# Patient Record
Sex: MALE | Race: WHITE | NOT HISPANIC OR LATINO | URBAN - METROPOLITAN AREA
[De-identification: names, ages, dates, MRNs, and addresses within clinical notes are randomized per-mention and may not be internally consistent; named-entity substitution may affect disease eponyms.]

---

## 2023-07-14 ENCOUNTER — INPATIENT (INPATIENT)
Facility: HOSPITAL | Age: 58
LOS: 1 days | Discharge: HOME | End: 2023-07-16
Attending: STUDENT IN AN ORGANIZED HEALTH CARE EDUCATION/TRAINING PROGRAM | Admitting: STUDENT IN AN ORGANIZED HEALTH CARE EDUCATION/TRAINING PROGRAM
Payer: MEDICAID

## 2023-07-14 VITALS
RESPIRATION RATE: 16 BRPM | HEART RATE: 63 BPM | OXYGEN SATURATION: 99 % | TEMPERATURE: 98 F | SYSTOLIC BLOOD PRESSURE: 171 MMHG | DIASTOLIC BLOOD PRESSURE: 96 MMHG

## 2023-07-14 DIAGNOSIS — R07.9 CHEST PAIN, UNSPECIFIED: ICD-10-CM

## 2023-07-14 LAB
ALBUMIN SERPL ELPH-MCNC: 4.8 G/DL — SIGNIFICANT CHANGE UP (ref 3.3–5)
ALP SERPL-CCNC: 80 U/L — SIGNIFICANT CHANGE UP (ref 40–120)
ALT FLD-CCNC: 17 U/L — SIGNIFICANT CHANGE UP (ref 4–41)
ANION GAP SERPL CALC-SCNC: 13 MMOL/L — SIGNIFICANT CHANGE UP (ref 7–14)
APTT BLD: 38.6 SEC — HIGH (ref 27–36.3)
AST SERPL-CCNC: 24 U/L — SIGNIFICANT CHANGE UP (ref 4–40)
BASOPHILS # BLD AUTO: 0.04 K/UL — SIGNIFICANT CHANGE UP (ref 0–0.2)
BASOPHILS NFR BLD AUTO: 0.5 % — SIGNIFICANT CHANGE UP (ref 0–2)
BILIRUB SERPL-MCNC: 0.5 MG/DL — SIGNIFICANT CHANGE UP (ref 0.2–1.2)
BUN SERPL-MCNC: 12 MG/DL — SIGNIFICANT CHANGE UP (ref 7–23)
CALCIUM SERPL-MCNC: 10.5 MG/DL — SIGNIFICANT CHANGE UP (ref 8.4–10.5)
CHLORIDE SERPL-SCNC: 97 MMOL/L — LOW (ref 98–107)
CO2 SERPL-SCNC: 29 MMOL/L — SIGNIFICANT CHANGE UP (ref 22–31)
CREAT SERPL-MCNC: 0.71 MG/DL — SIGNIFICANT CHANGE UP (ref 0.5–1.3)
EGFR: 106 ML/MIN/1.73M2 — SIGNIFICANT CHANGE UP
EOSINOPHIL # BLD AUTO: 0.21 K/UL — SIGNIFICANT CHANGE UP (ref 0–0.5)
EOSINOPHIL NFR BLD AUTO: 2.5 % — SIGNIFICANT CHANGE UP (ref 0–6)
GLUCOSE SERPL-MCNC: 83 MG/DL — SIGNIFICANT CHANGE UP (ref 70–99)
HCT VFR BLD CALC: 43.8 % — SIGNIFICANT CHANGE UP (ref 39–50)
HGB BLD-MCNC: 14.4 G/DL — SIGNIFICANT CHANGE UP (ref 13–17)
IANC: 5.95 K/UL — SIGNIFICANT CHANGE UP (ref 1.8–7.4)
IMM GRANULOCYTES NFR BLD AUTO: 0.2 % — SIGNIFICANT CHANGE UP (ref 0–0.9)
INR BLD: 1.12 RATIO — SIGNIFICANT CHANGE UP (ref 0.88–1.16)
LYMPHOCYTES # BLD AUTO: 1.7 K/UL — SIGNIFICANT CHANGE UP (ref 1–3.3)
LYMPHOCYTES # BLD AUTO: 20.2 % — SIGNIFICANT CHANGE UP (ref 13–44)
MCHC RBC-ENTMCNC: 30.6 PG — SIGNIFICANT CHANGE UP (ref 27–34)
MCHC RBC-ENTMCNC: 32.9 GM/DL — SIGNIFICANT CHANGE UP (ref 32–36)
MCV RBC AUTO: 93 FL — SIGNIFICANT CHANGE UP (ref 80–100)
MONOCYTES # BLD AUTO: 0.5 K/UL — SIGNIFICANT CHANGE UP (ref 0–0.9)
MONOCYTES NFR BLD AUTO: 5.9 % — SIGNIFICANT CHANGE UP (ref 2–14)
NEUTROPHILS # BLD AUTO: 5.95 K/UL — SIGNIFICANT CHANGE UP (ref 1.8–7.4)
NEUTROPHILS NFR BLD AUTO: 70.7 % — SIGNIFICANT CHANGE UP (ref 43–77)
NRBC # BLD: 0 /100 WBCS — SIGNIFICANT CHANGE UP (ref 0–0)
NRBC # FLD: 0 K/UL — SIGNIFICANT CHANGE UP (ref 0–0)
PLATELET # BLD AUTO: 173 K/UL — SIGNIFICANT CHANGE UP (ref 150–400)
POTASSIUM SERPL-MCNC: 4.9 MMOL/L — SIGNIFICANT CHANGE UP (ref 3.5–5.3)
POTASSIUM SERPL-SCNC: 4.9 MMOL/L — SIGNIFICANT CHANGE UP (ref 3.5–5.3)
PROT SERPL-MCNC: 8.8 G/DL — HIGH (ref 6–8.3)
PROTHROM AB SERPL-ACNC: 13 SEC — SIGNIFICANT CHANGE UP (ref 10.5–13.4)
RBC # BLD: 4.71 M/UL — SIGNIFICANT CHANGE UP (ref 4.2–5.8)
RBC # FLD: 12.8 % — SIGNIFICANT CHANGE UP (ref 10.3–14.5)
SODIUM SERPL-SCNC: 139 MMOL/L — SIGNIFICANT CHANGE UP (ref 135–145)
TROPONIN T, HIGH SENSITIVITY RESULT: 10 NG/L — SIGNIFICANT CHANGE UP
TROPONIN T, HIGH SENSITIVITY RESULT: 9 NG/L — SIGNIFICANT CHANGE UP
WBC # BLD: 8.42 K/UL — SIGNIFICANT CHANGE UP (ref 3.8–10.5)
WBC # FLD AUTO: 8.42 K/UL — SIGNIFICANT CHANGE UP (ref 3.8–10.5)

## 2023-07-14 PROCEDURE — 99223 1ST HOSP IP/OBS HIGH 75: CPT

## 2023-07-14 PROCEDURE — 71045 X-RAY EXAM CHEST 1 VIEW: CPT | Mod: 26

## 2023-07-14 PROCEDURE — 99285 EMERGENCY DEPT VISIT HI MDM: CPT

## 2023-07-14 RX ORDER — METHADONE HYDROCHLORIDE 40 MG/1
150 TABLET ORAL ONCE
Refills: 0 | Status: DISCONTINUED | OUTPATIENT
Start: 2023-07-14 | End: 2023-07-14

## 2023-07-14 RX ORDER — ACETAMINOPHEN 500 MG
650 TABLET ORAL EVERY 6 HOURS
Refills: 0 | Status: DISCONTINUED | OUTPATIENT
Start: 2023-07-14 | End: 2023-07-16

## 2023-07-14 RX ADMIN — METHADONE HYDROCHLORIDE 150 MILLIGRAM(S): 40 TABLET ORAL at 13:22

## 2023-07-14 NOTE — H&P ADULT - NSICDXPASTMEDICALHX_GEN_ALL_CORE_FT
PAST MEDICAL HISTORY:  BPH (benign prostatic hyperplasia)     CVA (cerebrovascular accident)     Essential hypertension     IV drug abuse     Methadone maintenance therapy patient     Polysubstance abuse     Schizophrenia     Stomach ulcer

## 2023-07-14 NOTE — PATIENT PROFILE ADULT - FALL HARM RISK - HARM RISK INTERVENTIONS

## 2023-07-14 NOTE — ED ADULT NURSE REASSESSMENT NOTE - NS ED NURSE REASSESS COMMENT FT1
Pt calm cooperative, no distress noted. Denies any chest pain at present. NSR on tele monitor. Food tray given to patient. Tolerated well.

## 2023-07-14 NOTE — ED ADULT NURSE NOTE - OBJECTIVE STATEMENT
Facilitator RN-  [id number] Patient received in stretcher. AOX4. Respirations even and unlabored.  Spontaneous movement of all extremities noted. Presents to ER c/o palpitations and increase of anxiety due to not being able to get his Methadone because of dosage being unavailable. Patient states he didn't want to stay there due to " unsanitary conditions" and decided to come to ER instead. MD at bedside for eval. Pending orders. Denies SI, HI, visual or auditory disturbances. Comfort and safety maintained. All current care needs met. Care plan continued Gee LOCKWOOD

## 2023-07-14 NOTE — H&P ADULT - NSHPLABSRESULTS_GEN_ALL_CORE
14.4   8.42  )-----------( 173      ( 14 Jul 2023 11:18 )             43.8     139  |  97<L>  |  12  ----------------------------<  83     07-14  4.9   |  29  |  0.71    Ca    10.5      14 Jul 2023 11:18  Phos  4.5     07-14  Mg     2.00     07-14    TPro  8.8<H>  /  Alb  4.8  /  TBili  0.5  /  DBili  x   /  AST  24  /  ALT  17  /  AlkPhos  80  07-14    PT/INR: 13.0/1.12 (07-14-23 @ 11:18)  PTT: 38.6 (07-14-23 @ 11:18)    hs Troponin, T - 9 ng/L (07-14-23 @ 23:43)  hs Troponin, T - 9 ng/L (07-14-23 @ 13:45)  hs Troponin, T - 10 ng/L (07-14-23 @ 11:18)    Hgb A1c: 5.4 (07-14-23 @ 23:43)    Thyroid Stimulating Hormone, Serum: 2.01 uIU/mL (07.14.23 @ 23:43)    CXR personally reviewed and interpreted - No focal consolidations, hyperinflated     EKG personally reviewed and interpreted - NSR 61bpm, LVH, QTc 475ms, biphasic T in II, III, aVF. U wave V2

## 2023-07-14 NOTE — ED PROVIDER NOTE - CLINICAL SUMMARY MEDICAL DECISION MAKING FREE TEXT BOX
56 yo M w/ PMHx of CVA, ACS, HTN, on amlodipine/lisinopril, stomach ulcer on omeprazole, BPH on prazosin/Flomax, cocaine/heroin snorting (previous IV drug user), and unspecified psych disorder on Abilify/clonazepam presents from [facility] for irregular blood pressures and chest pain after not receiving methadone 150 mg dosage at 16 today morning.  Vital signs remarkable for /100.  Physical exam remarkable for crackles in lung, tremors, and agitation.  Plan for ACS labs, EKG, chest x-ray, and methadone.  We will monitor for opiate withdrawal.  Dispo likely admission.

## 2023-07-14 NOTE — H&P ADULT - NSICDXFAMILYHX_GEN_ALL_CORE_FT
FAMILY HISTORY:  Mother  Still living? Unknown  FH: premature coronary heart disease, Age at diagnosis: Age Unknown

## 2023-07-14 NOTE — ED ADULT NURSE REASSESSMENT NOTE - NS ED NURSE REASSESS COMMENT FT1
Pt denies any discomfort at present. NSR on tele monitor. Denies any chest pain, dizziness, SOB. Pt calm cooperative, awaiting bed assignment. Pt verbalized understanding.

## 2023-07-14 NOTE — ED ADULT NURSE NOTE - CHIEF COMPLAINT QUOTE
Pt coming from [facility] c/o chest pain beginning this morning, reports he did not take his methadone this morning. Reports blood pressure has been fluctuating, denies headache, dizziness. Currently denies CP.

## 2023-07-14 NOTE — ED PROVIDER NOTE - PHYSICAL EXAMINATION
GENERAL: no acute distress, ectomorphic body habitus  HEENT: atraumatic, normocephalic, vision grossly intact, EOMI, no conjunctivitis or discharge, hearing grossly intact, no nasal discharge or epistaxis, clear pharynx  CV: regular rate, normal rhythm, normal S1/S2, no murmurs/rubs, no cyanosis  PULM: normal work of breathing, normal O2 saturation on -, clear breath sounds in b/l upper/lower lung fields, no crackles/rales/rhonchi/wheezing  GI: soft/non-tender/nondistended abdomen, no guarding or rebound tenderness, no palpable masses  : no CVA tenderness  NEURO: A&Ox4, follows commands, normal speech, no focal motor or sensory deficits  MSK: no joint tenderness/swelling/erythema, ranging all extremities with no appreciable loss of ROM  EXT: no peripheral edema, no calf tenderness, no redness or swelling  SKIN: warm, dry, and intact, no rashes  PSYCH: appropriate mood and affect GENERAL: no acute distress, ectomorphic body habitus  HEENT: atraumatic, normocephalic, vision grossly intact, EOMI, no conjunctivitis or discharge, hearing grossly intact, no nasal discharge or epistaxis, clear pharynx  CV: regular rate, normal rhythm, normal S1/S2, no murmurs/rubs, no cyanosis  PULM: crackles, normal work of breathing, clear breath sounds in b/l upper/lower lung fields  GI: soft/non-tender/nondistended abdomen, no guarding or rebound tenderness, no palpable masses  NEURO: tremors, A&Ox4, follows commands, normal speech, no focal motor or sensory deficits  MSK: no joint tenderness/swelling/erythema, ranging all extremities with no appreciable loss of ROM  EXT: no peripheral edema, no calf tenderness, no redness or swelling  SKIN: warm, dry, and intact, no rashes  PSYCH: agitated

## 2023-07-14 NOTE — H&P ADULT - TIME BILLING
Preparing to see the patient including review of tests and other providers' notes, confirming history with patient via  phone, performing medical examination and evaluation, counseling and educating the patient, ordering medications, tests, communicating with other health care professionals, documenting clinical information in the EMR, independently interpreting results and communicating results to the patient, care coordination

## 2023-07-14 NOTE — H&P ADULT - HISTORY OF PRESENT ILLNESS
57 -year-old male with a history of CVA, HTN, stomach ulcer, BPH, polysubstance abuse on methadone, schizophrenia, presenting from [facility] for elevated blood pressure (197/107) and chest pain after not receiving methadone 150 mg dosage this morning. Patient has been on methadone 150 mg daily since 1995 and has been receiving methadone through the [facility] (ID [id number], [address] [place] [address] Street in [place]).  After multiple relapses (cocaine/heroin/fentanyl) over the past 1-2 years patient sought rehabilitation services and was referred to [facility] today.  [place] was stated that they would not be able to give his methadone doses until 4 PM after the onboarding process however he did not wish to wait until then due to chest pain, and pain associate with stomach ulcer.  He was asked to present to the ED for the symptoms and was transported .  He last used cocaine and fentanyl 2 days ago and had chest pain afterwards. Chest pain is left sided, radiates to L arm, and posterior L shoulder associated with shortness of breath and dizziness intermittently. He denies nausea or diaphoresis. He denies any history of MI or CAD, denies prior cardiac w/u.     In the ED VS: 98.3  59-66  139-171/52-96  16-18  %RA, received methadone 150mg PO x1 57 -year-old male with a history of CVA, HTN, stomach ulcer, history of seizure s/p concussion (last seizure 1 year ago), BPH, polysubstance abuse on methadone, schizophrenia, presenting from [facility] for elevated blood pressure (197/107) and chest pain after not receiving methadone 150 mg dosage this morning. Patient has been on methadone 150 mg daily since 1995 and has been receiving methadone through the [facility] (ID [id number], [address] [place] [address] Street in [place]).  After multiple relapses (cocaine/heroin/fentanyl) over the past 1-2 years patient sought rehabilitation services and was referred to [facility] today.  [place] was stated that they would not be able to give his methadone doses until 4 PM after the onboarding process however he did not wish to wait until then due to chest pain, and pain associate with stomach ulcer.  He was asked to present to the ED for the symptoms and was transported .  He last used cocaine and fentanyl 2 days ago and had chest pain afterwards. Chest pain is left sided, radiates to L arm, and posterior L shoulder associated with shortness of breath and dizziness intermittently. He denies nausea or diaphoresis. He denies any history of MI or CAD, denies prior cardiac w/u.     In the ED VS: 98.3  59-66  139-171/52-96  16-18  %RA, received methadone 150mg PO x1

## 2023-07-14 NOTE — ED ADULT NURSE REASSESSMENT NOTE - NS ED NURSE REASSESS COMMENT FT1
Break RN, pt resting in stretcher on tele monitoring. respirations even and unlabored. has no complaints at this time, only wants his methodone dose. called pharmacy, pending verification. pt made aware of delay.

## 2023-07-14 NOTE — H&P ADULT - NSHPREVIEWOFSYSTEMS_GEN_ALL_CORE
REVIEW OF SYSTEMS:    CONSTITUTIONAL: No fevers; reported chills earlier  EYES/ENT: No visual changes; No dysphagia; No sore throat; No rhinorrhea; No sinus pain/pressure  NECK: No pain or stiffness  RESPIRATORY: (+) cough productive of green mucous; No wheezing or hemoptysis; (+) shortness of breath  CARDIOVASCULAR: (+) chest pain; No palpitations; No lower extremity edema  GASTROINTESTINAL: No abdominal or epigastric pain. No nausea, vomiting, or hematemesis; No diarrhea or constipation. No melena or hematochezia.  GENITOURINARY: No dysuria, frequency or hematuria  NEUROLOGICAL: No numbness, paresthesias, or weakness; No HA; No LH/dizziness  MSK: ambulates without aid; No falls  SKIN: No itching, burning, rashes, or lesions   All other review of systems is negative unless indicated above.

## 2023-07-14 NOTE — H&P ADULT - PROBLEM SELECTOR PLAN 1
patient relates pain to stress, reproducible on exam  also reports pain after using cocaine, last used 3d ago reportedly only a small amount, educated patient on no safe amount of cocaine, should completely abstain  check echo  flat troponin   bradycardic with intermittent bigem on monitor, consulted cards given report of chest pain during my exam  monitor on tele   TSH wnl  f/u official read of cxr

## 2023-07-14 NOTE — ED PROVIDER NOTE - ATTENDING CONTRIBUTION TO CARE
57-year-old male history of hypertension, ACS, CVA, drug use disorder, on methadone, reports relapsing 1 year ago, last drug use reportedly 3 days ago when he used cocaine and fentanyl.  Patient states at that time he developed chest pain, which is why he used fentanyl after cocaine.  Patient states he typically gets methadone from [place], but at the request of his mother enrolled in rehab at [facility], today was the first day.  It did not have the methadone ready for him this morning, typically gets dose at 6 AM but was told that his dose would not be ready till 4 PM.  Patient attributes his symptoms today to not getting his methadone.  Patient states pain has been there since doing cocaine a few days ago.  Denies associated nausea, vomiting, diaphoresis.  On exam patient is anxious appearing, uncomfortable likely secondary to withdrawal symptoms from not receiving methadone, otherwise heart regular rate and rhythm, lungs clear to auscultation bilaterally.  Plan for EKG chest x-ray, labs, cardiac monitor patient to be admitted, will treat with regular dose of methadone.

## 2023-07-14 NOTE — H&P ADULT - PROBLEM SELECTOR PLAN 7
SCDs for DVT ppx SCDs for DVT ppx    #seizure history  reportedly on unknown antiepileptic  confirm home meds in AM and restart

## 2023-07-14 NOTE — ED PROVIDER NOTE - OBJECTIVE STATEMENT
58 yo M w/ PMHx of CVA, ACS, HTN, on amlodipine/lisinopril, stomach ulcer on omeprazole, BPH on prazosin/Flomax, cocaine/heroin snorting (previous IV drug user), and unspecified psych disorder on Abilify/clonazepam presents from [facility] for irregular blood pressures and chest pain after not receiving methadone 150 mg dosage at 16 today morning.  Patient's been on methadone 150 mg daily since 1995.  He has been receiving methadone through the [facility] (ID [id number], [address] [place] [address] in [place]).  After multiple relapses (cocaine/heroin/fentanyl) over the past 1-2 years patient sought rehabilitation services and was referred to [facility] [place] today.  [place] was stated that they would not be able to give his methadone doses until 4 PM after the onboarding process.  He did not wish to wait till then due to CP, irregular BPs, and pain associate with stomach ulcer.  He was asked to present to the ED for the symptoms.  He last used cocaine and fentanyl 2 days ago and had chest pain.  Today, on ROS she endorses shortness of breath, palpitations, and tremors.    [place] : [id number]

## 2023-07-14 NOTE — ED ADULT NURSE REASSESSMENT NOTE - NS ED NURSE REASSESS COMMENT FT1
Report received from FABIÁN Sarmiento. Pt at baseline mental status, breathing even and unlabored in bed. Pt denies chest pain, SOB, dizziness, headache, blurry vision, chills. Bed in lowest position, pt awaiting transport to . Report had to be faxed due to  RN being unable to take report after calling 3 times.

## 2023-07-14 NOTE — ED ADULT NURSE NOTE - NSFALLUNIVINTERV_ED_ALL_ED
Bed/Stretcher in lowest position, wheels locked, appropriate side rails in place/Call bell, personal items and telephone in reach/Instruct patient to call for assistance before getting out of bed/chair/stretcher/Non-slip footwear applied when patient is off stretcher/[place] to call system/Physically safe environment - no spills, clutter or unnecessary equipment/Purposeful proactive rounding/Room/bathroom lighting operational, light cord in reach

## 2023-07-14 NOTE — H&P ADULT - PROBLEM SELECTOR PLAN 5
presents with 5 medications in bag, PPI not included however per ED notes patient on PPI  will c/w PPI for now, confirm home meds with PMD in AM if able

## 2023-07-14 NOTE — H&P ADULT - ASSESSMENT
57 -year-old male with a history of CVA, HTN, stomach ulcer, BPH, polysubstance abuse on methadone, schizophrenia, presenting from [facility] for "irregular" blood pressures and chest pain after not receiving methadone 150 mg dosage this morning
Yes

## 2023-07-14 NOTE — H&P ADULT - PROBLEM SELECTOR PLAN 4
reportedly taking Zyprexa however unknown dose  Notes from ED report patient taking Abilify  Would consult psych in AM  denies SI/HI, AH/VH  c/w clonazepam ([facility] Reference #: [id number])

## 2023-07-14 NOTE — ED PROVIDER NOTE - PROGRESS NOTE DETAILS
Self-Care for Sore Throats  Sore throats happen for many reasons, such as colds, allergies, and infections caused by viruses or bacteria. In any case, your throat becomes red and sore.  Your goal for self-care is to reduce your discomfort while giving you Contact your healthcare provider if you have:  · A temperature over 101°F (38.3°C)  · White spots on the throat  · Great difficulty swallowing  · Trouble breathing  · A skin rash  · Recent exposure to someone else with strep bacteria  · Severe hoarseness a Jessica PGY2: Labs unremarkable.  Patient reports improvement in symptoms after receiving methadone.  He wishes to go home and follow-up with outpatient methadone clinic.  He does not want to go to [facility] addiction facility.  Discussed with patient that multiple episodes of chest pain after cocaine use is concerning for ACS.  Also discussed with patient be able to also receive methadone while inpatient, be evaluated by cardiology, and would be able to discuss alternate rehabilitation programs.  Patient agreeable to admission.  Admitted to hospitalist under telemetry.

## 2023-07-14 NOTE — H&P ADULT - PROBLEM SELECTOR PLAN 2
confirm methadone dosing with [facility] in AM and restart daily methadone  educated to abstain from cocaine    consult in AM

## 2023-07-14 NOTE — H&P ADULT - NSHPPHYSICALEXAM_GEN_ALL_CORE
Vital Signs Last 24 Hrs  T(C): 36.8 (14 Jul 2023 21:59), Max: 36.8 (14 Jul 2023 17:21)  T(F): 98.3 (14 Jul 2023 21:59), Max: 98.3 (14 Jul 2023 17:21)  HR: 59 (14 Jul 2023 21:59) (59 - 92)  BP: 144/79 (14 Jul 2023 21:59) (126/80 - 171/96)  RR: 18 (14 Jul 2023 21:59) (16 - 18)  SpO2: 98% (14 Jul 2023 21:59) (95% - 100%)    Parameters below as of 14 Jul 2023 21:59  Patient On (Oxygen Delivery Method): room air    PHYSICAL EXAM:  GENERAL: NAD, thin  HEAD:  Atraumatic, temporal wasting  EYES: EOMI, PERRL, conjunctiva and sclera clear  NECK: Supple, No JVD  CHEST/LUNG: Clear to auscultation bilaterally; No wheezes, rales or rhonchi; normal work of breathing, speaking in full sentences  HEART: irregular rate (bigem on monitor), bradycardic; No murmurs, rubs, or gallops, (+)S1, S2; chest pain reproducible on exam  ABDOMEN: Soft, Nondistended; Normal Bowel sounds; (+)epigastric TTP  EXTREMITIES:  2+ Peripheral Pulses, No clubbing, cyanosis, or edema  PSYCH: agitated with questions, aside from interrupting interviewer and  and expletives expressed to , remaining calm and relatively cooperative with exam with expressed frustration, A&Ox3, denies SI/HI, AH/VH  NEUROLOGY: no focal neuro deficits, strength 5/5 x4 extremities, sensation grossly intact  SKIN: No rashes or lesions on limited exam in ED hallway

## 2023-07-15 DIAGNOSIS — R00.1 BRADYCARDIA, UNSPECIFIED: ICD-10-CM

## 2023-07-15 DIAGNOSIS — N40.0 BENIGN PROSTATIC HYPERPLASIA WITHOUT LOWER URINARY TRACT SYMPTOMS: ICD-10-CM

## 2023-07-15 DIAGNOSIS — I25.10 ATHEROSCLEROTIC HEART DISEASE OF NATIVE CORONARY ARTERY WITHOUT ANGINA PECTORIS: ICD-10-CM

## 2023-07-15 DIAGNOSIS — Z29.9 ENCOUNTER FOR PROPHYLACTIC MEASURES, UNSPECIFIED: ICD-10-CM

## 2023-07-15 DIAGNOSIS — F19.10 OTHER PSYCHOACTIVE SUBSTANCE ABUSE, UNCOMPLICATED: ICD-10-CM

## 2023-07-15 DIAGNOSIS — F20.9 SCHIZOPHRENIA, UNSPECIFIED: ICD-10-CM

## 2023-07-15 DIAGNOSIS — K25.9 GASTRIC ULCER, UNSPECIFIED AS ACUTE OR CHRONIC, WITHOUT HEMORRHAGE OR PERFORATION: ICD-10-CM

## 2023-07-15 DIAGNOSIS — R07.9 CHEST PAIN, UNSPECIFIED: ICD-10-CM

## 2023-07-15 DIAGNOSIS — I10 ESSENTIAL (PRIMARY) HYPERTENSION: ICD-10-CM

## 2023-07-15 LAB
A1C WITH ESTIMATED AVERAGE GLUCOSE RESULT: 5.4 % — SIGNIFICANT CHANGE UP (ref 4–5.6)
ANION GAP SERPL CALC-SCNC: 9 MMOL/L — SIGNIFICANT CHANGE UP (ref 7–14)
BUN SERPL-MCNC: 21 MG/DL — SIGNIFICANT CHANGE UP (ref 7–23)
CALCIUM SERPL-MCNC: 10 MG/DL — SIGNIFICANT CHANGE UP (ref 8.4–10.5)
CHLORIDE SERPL-SCNC: 102 MMOL/L — SIGNIFICANT CHANGE UP (ref 98–107)
CHOLEST SERPL-MCNC: 131 MG/DL — SIGNIFICANT CHANGE UP
CO2 SERPL-SCNC: 29 MMOL/L — SIGNIFICANT CHANGE UP (ref 22–31)
CREAT SERPL-MCNC: 0.92 MG/DL — SIGNIFICANT CHANGE UP (ref 0.5–1.3)
EGFR: 96 ML/MIN/1.73M2 — SIGNIFICANT CHANGE UP
ESTIMATED AVERAGE GLUCOSE: 108 — SIGNIFICANT CHANGE UP
GLUCOSE SERPL-MCNC: 91 MG/DL — SIGNIFICANT CHANGE UP (ref 70–99)
HCT VFR BLD CALC: 40.9 % — SIGNIFICANT CHANGE UP (ref 39–50)
HCV AB S/CO SERPL IA: 12.29 S/CO — HIGH (ref 0–0.99)
HCV AB SERPL-IMP: REACTIVE
HDLC SERPL-MCNC: 62 MG/DL — SIGNIFICANT CHANGE UP
HGB BLD-MCNC: 13.7 G/DL — SIGNIFICANT CHANGE UP (ref 13–17)
LIPID PNL WITH DIRECT LDL SERPL: 56 MG/DL — SIGNIFICANT CHANGE UP
MAGNESIUM SERPL-MCNC: 2 MG/DL — SIGNIFICANT CHANGE UP (ref 1.6–2.6)
MAGNESIUM SERPL-MCNC: 2 MG/DL — SIGNIFICANT CHANGE UP (ref 1.6–2.6)
MCHC RBC-ENTMCNC: 31.4 PG — SIGNIFICANT CHANGE UP (ref 27–34)
MCHC RBC-ENTMCNC: 33.5 GM/DL — SIGNIFICANT CHANGE UP (ref 32–36)
MCV RBC AUTO: 93.6 FL — SIGNIFICANT CHANGE UP (ref 80–100)
NON HDL CHOLESTEROL: 69 MG/DL — SIGNIFICANT CHANGE UP
NRBC # BLD: 0 /100 WBCS — SIGNIFICANT CHANGE UP (ref 0–0)
NRBC # FLD: 0 K/UL — SIGNIFICANT CHANGE UP (ref 0–0)
NT-PROBNP SERPL-SCNC: 410 PG/ML — HIGH
PHOSPHATE SERPL-MCNC: 3.5 MG/DL — SIGNIFICANT CHANGE UP (ref 2.5–4.5)
PHOSPHATE SERPL-MCNC: 4.5 MG/DL — SIGNIFICANT CHANGE UP (ref 2.5–4.5)
PLATELET # BLD AUTO: 141 K/UL — LOW (ref 150–400)
POTASSIUM SERPL-MCNC: 3.9 MMOL/L — SIGNIFICANT CHANGE UP (ref 3.5–5.3)
POTASSIUM SERPL-SCNC: 3.9 MMOL/L — SIGNIFICANT CHANGE UP (ref 3.5–5.3)
RBC # BLD: 4.37 M/UL — SIGNIFICANT CHANGE UP (ref 4.2–5.8)
RBC # FLD: 12.7 % — SIGNIFICANT CHANGE UP (ref 10.3–14.5)
SODIUM SERPL-SCNC: 140 MMOL/L — SIGNIFICANT CHANGE UP (ref 135–145)
TRIGL SERPL-MCNC: 64 MG/DL — SIGNIFICANT CHANGE UP
TROPONIN T, HIGH SENSITIVITY RESULT: 9 NG/L — SIGNIFICANT CHANGE UP
TSH SERPL-MCNC: 2.01 UIU/ML — SIGNIFICANT CHANGE UP (ref 0.27–4.2)
WBC # BLD: 5.3 K/UL — SIGNIFICANT CHANGE UP (ref 3.8–10.5)
WBC # FLD AUTO: 5.3 K/UL — SIGNIFICANT CHANGE UP (ref 3.8–10.5)

## 2023-07-15 PROCEDURE — 99232 SBSQ HOSP IP/OBS MODERATE 35: CPT

## 2023-07-15 PROCEDURE — 99221 1ST HOSP IP/OBS SF/LOW 40: CPT

## 2023-07-15 RX ORDER — LISINOPRIL 2.5 MG/1
40 TABLET ORAL DAILY
Refills: 0 | Status: DISCONTINUED | OUTPATIENT
Start: 2023-07-15 | End: 2023-07-16

## 2023-07-15 RX ORDER — ALBUTEROL 90 UG/1
2 AEROSOL, METERED ORAL
Refills: 0 | DISCHARGE

## 2023-07-15 RX ORDER — CLONAZEPAM 1 MG
1 TABLET ORAL THREE TIMES A DAY
Refills: 0 | Status: DISCONTINUED | OUTPATIENT
Start: 2023-07-15 | End: 2023-07-16

## 2023-07-15 RX ORDER — METHADONE HYDROCHLORIDE 40 MG/1
150 TABLET ORAL
Refills: 0 | DISCHARGE

## 2023-07-15 RX ORDER — OLANZAPINE 15 MG/1
1 TABLET, FILM COATED ORAL
Refills: 0 | DISCHARGE

## 2023-07-15 RX ORDER — LISINOPRIL 2.5 MG/1
1 TABLET ORAL
Refills: 0 | DISCHARGE

## 2023-07-15 RX ORDER — CLONAZEPAM 1 MG
1 TABLET ORAL
Refills: 0 | DISCHARGE

## 2023-07-15 RX ORDER — GABAPENTIN 400 MG/1
600 CAPSULE ORAL DAILY
Refills: 0 | Status: DISCONTINUED | OUTPATIENT
Start: 2023-07-15 | End: 2023-07-16

## 2023-07-15 RX ORDER — TAMSULOSIN HYDROCHLORIDE 0.4 MG/1
0.4 CAPSULE ORAL AT BEDTIME
Refills: 0 | Status: DISCONTINUED | OUTPATIENT
Start: 2023-07-15 | End: 2023-07-16

## 2023-07-15 RX ORDER — HALOPERIDOL DECANOATE 100 MG/ML
2.5 INJECTION INTRAMUSCULAR EVERY 6 HOURS
Refills: 0 | Status: DISCONTINUED | OUTPATIENT
Start: 2023-07-15 | End: 2023-07-15

## 2023-07-15 RX ORDER — ALBUTEROL 90 UG/1
2 AEROSOL, METERED ORAL
Refills: 0 | Status: DISCONTINUED | OUTPATIENT
Start: 2023-07-15 | End: 2023-07-16

## 2023-07-15 RX ORDER — METHADONE HYDROCHLORIDE 40 MG/1
150 TABLET ORAL DAILY
Refills: 0 | Status: DISCONTINUED | OUTPATIENT
Start: 2023-07-15 | End: 2023-07-16

## 2023-07-15 RX ORDER — HYDROXYZINE HCL 10 MG
25 TABLET ORAL EVERY 6 HOURS
Refills: 0 | Status: DISCONTINUED | OUTPATIENT
Start: 2023-07-15 | End: 2023-07-16

## 2023-07-15 RX ORDER — GABAPENTIN 400 MG/1
1 CAPSULE ORAL
Refills: 0 | DISCHARGE

## 2023-07-15 RX ORDER — TAMSULOSIN HYDROCHLORIDE 0.4 MG/1
1 CAPSULE ORAL
Refills: 0 | DISCHARGE

## 2023-07-15 RX ADMIN — METHADONE HYDROCHLORIDE 150 MILLIGRAM(S): 40 TABLET ORAL at 09:58

## 2023-07-15 RX ADMIN — GABAPENTIN 600 MILLIGRAM(S): 400 CAPSULE ORAL at 12:09

## 2023-07-15 RX ADMIN — LISINOPRIL 40 MILLIGRAM(S): 2.5 TABLET ORAL at 05:27

## 2023-07-15 RX ADMIN — Medication 1 MILLIGRAM(S): at 05:26

## 2023-07-15 RX ADMIN — Medication 1 MILLIGRAM(S): at 13:00

## 2023-07-15 RX ADMIN — TAMSULOSIN HYDROCHLORIDE 0.4 MILLIGRAM(S): 0.4 CAPSULE ORAL at 21:42

## 2023-07-15 RX ADMIN — Medication 1 MILLIGRAM(S): at 21:42

## 2023-07-15 NOTE — CONSULT NOTE ADULT - SUBJECTIVE AND OBJECTIVE BOX
HPI: 57 -year-old male with a history of CVA, HTN, stomach ulcer, BPH, polysubstance abuse on methadone, schizophrenia, presenting from [facility] for elevated blood pressure (197/107) and chest pain after not receiving methadone 150 mg dosage this morning. cardiology consulted for atypical chest pain.    Pt is poor historian, history obtained from chart review and limited response from pt. Pt currently denies any cp, palpitation, sob, orthopnea, PND, dizziness, lightheadedness or syncope.     T(C): 36.8 (07-14-23 @ 21:59), Max: 36.8 (07-14-23 @ 17:21)  HR: 59 (07-14-23 @ 21:59) (59 - 92)  BP: 144/79 (07-14-23 @ 21:59) (126/80 - 171/96)  RR: 18 (07-14-23 @ 21:59) (16 - 18)  SpO2: 98% (07-14-23 @ 21:59) (95% - 100%)    MEDICATIONS  (STANDING):  clonazePAM  Tablet 1 milliGRAM(s) Oral three times a day  gabapentin 600 milliGRAM(s) Oral daily  lisinopril 40 milliGRAM(s) Oral daily  tamsulosin 0.4 milliGRAM(s) Oral at bedtime    MEDICATIONS  (PRN):  acetaminophen     Tablet .. 650 milliGRAM(s) Oral every 6 hours PRN Mild Pain (1 - 3)  albuterol    90 MICROgram(s) HFA Inhaler 2 Puff(s) Inhalation four times a day PRN for shortness of breath and/or wheezing      I&O's Summary                        x                    x    | x    | x            x     >-----------< x       ------------------------< x                     x                    x    | x    | x                                            Ca x     Mg 2.00  Ph 4.5    Urinalysis Basic - ( 14 Jul 2023 11:18 )    Color: x / Appearance: x / SG: x / pH: x  Gluc: 83 mg/dL / Ketone: x  / Bili: x / Urobili: x   Blood: x / Protein: x / Nitrite: x   Leuk Esterase: x / RBC: x / WBC x   Sq Epi: x / Non Sq Epi: x / Bacteria: x        PT/INR - ( 14 Jul 2023 11:18 )   PT: 13.0 sec;   INR: 1.12 ratio         PTT - ( 14 Jul 2023 11:18 )  PTT:38.6 sec

## 2023-07-15 NOTE — PROGRESS NOTE ADULT - PROBLEM SELECTOR PLAN 8
DVT: SCDs  Diet: regular  Dispo: back to [facility] DVT: SCDs  Diet: regular  Dispo: home

## 2023-07-15 NOTE — PROGRESS NOTE ADULT - PROBLEM SELECTOR PLAN 4
Appreciate  input  -c/w Klonopin 1 mg TID  -add Atarax 25 mg q6hr PO PRN for anxiety  -add Haldol 2.5 mg q4hr PRN for severe agitation  -add Ativan 1 mg I q4hr PRN for severe agitation

## 2023-07-15 NOTE — CONSULT NOTE ADULT - ASSESSMENT
HPI: 57 -year-old male with a history of CVA, HTN, stomach ulcer, BPH, polysubstance abuse on methadone, schizophrenia, presenting from [facility] for elevated blood pressure (197/107) and chest pain after not receiving methadone 150 mg dosage this morning. cardiology consulted for atypical chest pain.    Assessment and Plan:  # Atypical chest pain  Pt currently does not appear in distress. denies any cp, palpitation, sob.   EKG w NSR, no acute ST-T changes. Trop x 3 neg.   Per record pt w known h/o cocaine,fentanyl abuse.   Chest pain is reproducible on palpation, likely musculoskeletal.  Not suggestive of ACS  - Tele monitoring  - No further need to trend cardiac enzymes if pt is chest pain free  - TTE in AM   - Check U tox, A1C, lipid panel    # Sinus bradycardia  Noted to have sinus joon in 40s on tele. No evidence of AV block. one short run of bigemi on tele.   Pt not on BB or CCB at home  - check TSH   - avoid BB or CCB for now  - replete electrolytes to keep K>4, Mg>2  - tele monitoring    Case discussed w on call cards fellow Dr Ashraf  Cardiology will continue to follow

## 2023-07-15 NOTE — PROGRESS NOTE ADULT - SUBJECTIVE AND OBJECTIVE BOX
[facility]  Don Lara MD  Available via MS Teams      SUBJECTIVE / OVERNIGHT EVENTS: No acute events overnight. Patient seen and examined, writer spoke to patient in native [place]ian, no acute complaints chest pain has resolved    ADDITIONAL REVIEW OF SYSTEMS:  10 point ROS negative unless indicated above     MEDICATIONS  (STANDING):  clonazePAM  Tablet 1 milliGRAM(s) Oral three times a day  gabapentin 600 milliGRAM(s) Oral daily  lisinopril 40 milliGRAM(s) Oral daily  methadone    Tablet 150 milliGRAM(s) Oral daily  tamsulosin 0.4 milliGRAM(s) Oral at bedtime    MEDICATIONS  (PRN):  acetaminophen     Tablet .. 650 milliGRAM(s) Oral every 6 hours PRN Mild Pain (1 - 3)  albuterol    90 MICROgram(s) HFA Inhaler 2 Puff(s) Inhalation four times a day PRN for shortness of breath and/or wheezing      I&O's Summary      PHYSICAL EXAM:  Vital Signs Last 24 Hrs  T(C): 36.8 (15 Jul 2023 12:06), Max: 37.1 (14 Jul 2023 23:00)  T(F): 98.2 (15 Jul 2023 12:06), Max: 98.8 (14 Jul 2023 23:00)  HR: 62 (15 Jul 2023 12:06) (56 - 92)  BP: 134/95 (15 Jul 2023 12:06) (126/80 - 158/100)  BP(mean): --  RR: 18 (15 Jul 2023 12:06) (18 - 18)  SpO2: 95% (15 Jul 2023 12:06) (95% - 100%)    Parameters below as of 15 Jul 2023 08:30  Patient On (Oxygen Delivery Method): room air      CONSTITUTIONAL: NAD, well-developed, well-groomed  EYES: PERRLA; conjunctiva and sclera clear  ENMT: Moist oral mucosa, no pharyngeal injection or exudates; normal dentition  NECK: Supple, no palpable masses; no thyromegaly  RESPIRATORY: Normal respiratory effort; lungs are clear to auscultation bilaterally  CARDIOVASCULAR: Regular rate and rhythm, normal S1 and S2, no murmur/rub/gallop; No lower extremity edema; Peripheral pulses are 2+ bilaterally  ABDOMEN: Nontender to palpation, normoactive bowel sounds, no rebound/guarding; No hepatosplenomegaly  MUSCULOSKELETAL:  Normal gait; no clubbing or cyanosis of digits; no joint swelling or tenderness to palpation  PSYCH: A+O to person, place, and time; affect appropriate  NEUROLOGY: CN 2-12 are intact and symmetric; no gross sensory deficits   SKIN: No rashes; no palpable lesions    LABS:                        13.7   5.30  )-----------( 141      ( 15 Jul 2023 07:26 )             40.9     07-15    140  |  102  |  21  ----------------------------<  91  3.9   |  29  |  0.92    Ca    10.0      15 Jul 2023 07:26  Phos  3.5     07-15  Mg     2.00     07-15    TPro  8.8<H>  /  Alb  4.8  /  TBili  0.5  /  DBili  x   /  AST  24  /  ALT  17  /  AlkPhos  80  07-14    PT/INR - ( 14 Jul 2023 11:18 )   PT: 13.0 sec;   INR: 1.12 ratio         PTT - ( 14 Jul 2023 11:18 )  PTT:38.6 sec      Urinalysis Basic - ( 15 Jul 2023 07:26 )    Color: x / Appearance: x / SG: x / pH: x  Gluc: 91 mg/dL / Ketone: x  / Bili: x / Urobili: x   Blood: x / Protein: x / Nitrite: x   Leuk Esterase: x / RBC: x / WBC x   Sq Epi: x / Non Sq Epi: x / Bacteria: x            RADIOLOGY & ADDITIONAL TESTS:   No new imaging, previous imaging reviewed by me

## 2023-07-15 NOTE — PROGRESS NOTE ADULT - PROBLEM SELECTOR PLAN 1
Resolved, low suspicion for ACS.  -troponin flat  -tele monitoring  -CXR wnl   -A1C/lipids WNL  -pending TTE   -pending Utox, TSH

## 2023-07-15 NOTE — PROGRESS NOTE ADULT - ASSESSMENT
57 -year-old male with a history of CVA, HTN, stomach ulcer, BPH, polysubstance abuse on methadone, schizophrenia, presenting from [facility] for "irregular" blood pressures and chest pain after not receiving methadone 150 mg dosage this morning

## 2023-07-15 NOTE — PROGRESS NOTE ADULT - PROBLEM SELECTOR PLAN 2
Sinus, tele shows joon to 40's w/o evidence of block  -check TSH  -avoid AV justin agents   -replete lytes   -tele

## 2023-07-16 VITALS
OXYGEN SATURATION: 97 % | HEART RATE: 98 BPM | TEMPERATURE: 97 F | RESPIRATION RATE: 18 BRPM | DIASTOLIC BLOOD PRESSURE: 84 MMHG | SYSTOLIC BLOOD PRESSURE: 138 MMHG

## 2023-07-16 PROCEDURE — 93306 TTE W/DOPPLER COMPLETE: CPT | Mod: 26

## 2023-07-16 PROCEDURE — 99239 HOSP IP/OBS DSCHRG MGMT >30: CPT

## 2023-07-16 RX ORDER — ACETAMINOPHEN 500 MG
2 TABLET ORAL
Qty: 0 | Refills: 0 | DISCHARGE
Start: 2023-07-16

## 2023-07-16 RX ADMIN — Medication 1 MILLIGRAM(S): at 06:11

## 2023-07-16 RX ADMIN — METHADONE HYDROCHLORIDE 150 MILLIGRAM(S): 40 TABLET ORAL at 07:19

## 2023-07-16 RX ADMIN — GABAPENTIN 600 MILLIGRAM(S): 400 CAPSULE ORAL at 13:00

## 2023-07-16 RX ADMIN — LISINOPRIL 40 MILLIGRAM(S): 2.5 TABLET ORAL at 06:11

## 2023-07-16 RX ADMIN — Medication 1 MILLIGRAM(S): at 13:04

## 2023-07-16 NOTE — DISCHARGE NOTE PROVIDER - HOSPITAL COURSE
57 -year-old male with a history of CVA, HTN, stomach ulcer, BPH, polysubstance abuse on methadone, schizophrenia, presenting from [facility] for "irregular" blood pressures and chest pain after not receiving methadone 150 mg dosage this morning    Chest pain.   ·  Plan: Resolved, low suspicion for ACS.  -troponin flat  -tele monitoring  -CXR wnl   -A1C/lipids WNL  -pending TTE   -pending Utox, TSH.    Bradycardia.   ·  Plan: Sinus, tele shows joon to 40's w/o evidence of block  -check TSH  -avoid AV justin agents   -replete lytes   -tele.     Problem/Plan - 3:  ·  Problem: Polysubstance abuse.   ·  Plan: Methadone dosing confirmed   -c/w Methadone 150 mg daily   - consulted appreciate recs.    Schizophrenia.   ·  Plan: Appreciate  input  -c/w Klonopin 1 mg TID  -add Atarax 25 mg q6hr PO PRN for anxiety  -add Haldol 2.5 mg q4hr PRN for severe agitation  -add Ativan 1 mg I q4hr PRN for severe agitation.    Stomach ulcer.   ·  Plan: c/w PPI, will need to confirm med rec.     Essential hypertension.   ·  Plan: normotensive  -c/w Lisinopril.    BPH (benign prostatic hyperplasia).   ·  Plan: c/w Tamsulosin. 57 -year-old male with a history of CVA, HTN, stomach ulcer, BPH, polysubstance abuse on methadone, schizophrenia, presenting from [facility] for "irregular" blood pressures and chest pain after not receiving methadone 150 mg dosage this morning    Chest pain.   ·  Plan: Resolved, low suspicion for ACS.  -troponin flat  -tele monitoring  -CXR wnl   -A1C/lipids WNL  -TTE EF 68% unremarkable   -pending Utox, TSH.    Bradycardia.   ·  Plan: Sinus, tele shows joon to 40's w/o evidence of block  -check TSH  -avoid AV justin agents   -replete lytes   -tele.    Polysubstance abuse.   ·  Plan: Methadone dosing confirmed   -c/w Methadone 150 mg daily   - consulted appreciate recs.    Schizophrenia.   ·  Plan: Appreciate  input  -c/w Klonopin 1 mg TID  -add Atarax 25 mg q6hr PO PRN for anxiety  -add Haldol 2.5 mg q4hr PRN for severe agitation  -add Ativan 1 mg I q4hr PRN for severe agitation.    Stomach ulcer.   ·  Plan: c/w PPI, will need to confirm med rec.    Essential hypertension.   ·  Plan: normotensive  -c/w Lisinopril.    BPH (benign prostatic hyperplasia).   ·  Plan: c/w Tamsulosin. 57 -year-old male with a history of CVA, HTN, stomach ulcer, BPH, polysubstance abuse on methadone, schizophrenia, presenting from [facility] for "irregular" blood pressures and chest pain after not receiving methadone 150 mg dosage this morning. Pt was hemodynamically stable for discharge, no complaints of chest pain after admission, TTE unremarkable. Will need to follow-up with his PCP for Hep C was likely chronic.    Chest pain.   ·  Plan: Resolved, low suspicion for ACS.  -troponin flat  -tele monitoring  -CXR wnl   -A1C/lipids WNL  -TTE EF 68% unremarkable   -pending Utox, TSH.    Bradycardia.   ·  Plan: Sinus, tele shows joon to 40's w/o evidence of block  -avoid AV justin agents   -replete lytes   -tele.    Polysubstance abuse.   ·  Plan: Methadone dosing confirmed   -c/w Methadone 150 mg daily   - consulted appreciate recs.    Schizophrenia.   ·  Plan: Appreciate  input  -c/w Klonopin 1 mg TID  -add Atarax 25 mg q6hr PO PRN for anxiety  -add Haldol 2.5 mg q4hr PRN for severe agitation  -add Ativan 1 mg I q4hr PRN for severe agitation.    Stomach ulcer.   ·  Plan: c/w PPI, will need to confirm med rec.    Essential hypertension.   ·  Plan: normotensive  -c/w Lisinopril.    BPH (benign prostatic hyperplasia).   ·  Plan: c/w Tamsulosin.

## 2023-07-16 NOTE — CHART NOTE - NSCHARTNOTEFT_GEN_A_CORE
Consult requested for pt for agitation/wanting to leave AMA.   [facility] psych is short-staffed but resident on call was able to go to bedside and assess as well as discuss with below attending on call. Abbreviated visit as follows:     57 -year-old male with a history of CVA, HTN, stomach ulcer, history of seizure s/p concussion (last seizure 1 year ago), BPH, polysubstance abuse on methadone, schizophrenia, presenting from [facility] for elevated blood pressure (197/107) and chest pain after not receiving methadone 150 mg dosage yesterday morning. Psych consulted for AMA/agitation/methadone question.     Chart reviewed. Per initial H&P: "Patient has been on methadone 150 mg daily since 1995 and has been receiving methadone through the [facility] (ID [id number], [address] [place] [address] in [place]).  After multiple relapses (cocaine/heroin/fentanyl) over the past 1-2 years patient sought rehabilitation services and was referred to [facility] today.  [place] was stated that they would not be able to give his methadone doses until 4 PM after the onboarding process however he did not wish to wait until then due to chest pain, and pain associate with stomach ulcer.  He was asked to present to the ED for the symptoms and was transported .  He last used cocaine and fentanyl 2 days ago and had chest pain afterwards. Chest pain is left sided, radiates to L arm, and posterior L shoulder associated with shortness of breath and dizziness intermittently. He denies nausea or diaphoresis. He denies any history of MI or CAD, denies prior cardiac w/u."     On exam, pt was calm, AA O X 3 and linear, agreeing to stay in hospital if he has his medications given correctly, particularly Klonopin and Methadone. No safety concerns/SI elicited.   Case discussed with NP and counseled on resuming Methadone 150mg daily at patient's preferred time.     Plan:   - Continue Methadone 150mg daily  - Continue Klonopin as ordered  - Routine observation, no indication for 1:1  - Currently appears to have capacity to refuse care if he chooses, though reassess capacity if needed (this can/should be done by primary team per [place] state law; call us if assistance required)  - For anxiety, can give Atarax 25mg PO Q 6 PRN anxiety  - For severe agitation, can give Haldol 2.5mg + Ativan 1mg IM Q 4 PRN severe agitation  - Dispo: no role for inpatient psych at this time. Suggest SBIRT to assess if substance use referral/rehab is indicated.  - Psych will continue to follow    Ramone Villegas MD   Consultation Psychiatry, Director  [phone]
[facility] ([phone]) spoke with Radha Bojorquez LPN who confirmed Ernesto Pineda  is a patient there and last received his Methadone dose 150 mg on 7/13/23 at 0700. Attending Dr Lara updated, and agreed to continue on current medication dose.
Pt seen and examined, stable for d/c, please see d/c summary for more information.    Don Lara MD

## 2023-07-16 NOTE — DISCHARGE NOTE PROVIDER - ATTENDING DISCHARGE PHYSICAL EXAMINATION:
PHYSICAL EXAM:  GENERAL: NAD, thin  HEAD:  Atraumatic, temporal wasting  EYES: EOMI, PERRL, conjunctiva and sclera clear  NECK: Supple, No JVD  CHEST/LUNG: Clear to auscultation bilaterally; No wheezes, rales or rhonchi; normal work of breathing, speaking in full sentences  HEART: No murmurs, rubs, or gallops, (+)S1, S2; chest pain reproducible on exam  ABDOMEN: Soft, Nondistended; Normal Bowel sounds; (+)epigastric TTP  EXTREMITIES:  2+ Peripheral Pulses, No clubbing, cyanosis, or edema  PSYCH: AOX3, no focal deficits   NEUROLOGY: no focal neuro deficits, strength 5/5 x4 extremities, sensation grossly intact  SKIN: No rashes or lesions on limited exam in ED hallway

## 2023-07-16 NOTE — DISCHARGE NOTE PROVIDER - CARE PROVIDER_API CALL
[facility],   [facility] - please go to your Methadone Clinic at 7:00A on Monday July 17, 2023  [phone]  Phone: (   )    -  Fax: (   )    -  Established Patient  Scheduled Appointment: 07/17/2023 07:00 AM    Primary Care Physician,   Please call and schedule outpatient follow up for ongoing medical management with in 1 week of discharge.  Phone: (   )    -  Fax: (   )    -  Follow Up Time:

## 2023-07-16 NOTE — DISCHARGE NOTE PROVIDER - NSDCMRMEDTOKEN_GEN_ALL_CORE_FT
Albuterol (Eqv-Proventil HFA) 90 mcg/inh inhalation aerosol: 2 puff(s) inhaled 4 times a day as needed for  shortness of breath and/or wheezing  clonazePAM 1 mg oral tablet: 1 tab(s) orally 3 times a day  gabapentin 600 mg oral tablet: 1 tab(s) orally once a day  lisinopril 40 mg oral tablet: 1 tab(s) orally once a day  methadone 10 mg/mL oral concentrate: 150 milligram(s) orally once a day  tamsulosin 0.4 mg oral capsule: 1 cap(s) orally once a day  ZyPREXA 15 mg oral tablet: 1 tab(s) orally once a day   acetaminophen 325 mg oral tablet: 2 tab(s) orally every 6 hours As needed Mild Pain (1 - 3)  Albuterol (Eqv-Proventil HFA) 90 mcg/inh inhalation aerosol: 2 puff(s) inhaled 4 times a day as needed for  shortness of breath and/or wheezing  clonazePAM 1 mg oral tablet: 1 tab(s) orally 3 times a day  gabapentin 600 mg oral tablet: 1 tab(s) orally once a day  lisinopril 40 mg oral tablet: 1 tab(s) orally once a day  methadone 10 mg/mL oral concentrate: 150 milligram(s) orally once a day  tamsulosin 0.4 mg oral capsule: 1 cap(s) orally once a day  ZyPREXA 15 mg oral tablet: 1 tab(s) orally once a day

## 2023-07-16 NOTE — DISCHARGE NOTE PROVIDER - PROVIDER TOKENS
FREE:[LAST:[[facility]],PHONE:[(   )    -],FAX:[(   )    -],ADDRESS:[[facility] - please go to your Methadone Clinic at 7:00A on Monday July 17, 2023  [phone]],SCHEDULEDAPPT:[07/17/2023],SCHEDULEDAPPTTIME:[07:00 AM],ESTABLISHEDPATIENT:[T]],FREE:[LAST:[Primary Care Physician],PHONE:[(   )    -],FAX:[(   )    -],ADDRESS:[Please call and schedule outpatient follow up for ongoing medical management with in 1 week of discharge.]]

## 2023-07-16 NOTE — DISCHARGE NOTE PROVIDER - NSDCFUADDAPPT_GEN_ALL_CORE_FT
Please go to your Methadone Clinic on Monday July 17,2023  Please call and schedule follow up with your Primary Care Physician for further work up for Hepatitis C.

## 2023-07-16 NOTE — DISCHARGE NOTE NURSING/CASE MANAGEMENT/SOCIAL WORK - PATIENT PORTAL LINK FT
You can access the FollowMyHealth Patient Portal offered by [facility] by registering at the following website: [email or web address]. By joining [employer]’s FollowMyHealth portal, you will also be able to view your health information using other applications (apps) compatible with our system.

## 2023-07-16 NOTE — DISCHARGE NOTE PROVIDER - NSDCCPCAREPLAN_GEN_ALL_CORE_FT
PRINCIPAL DISCHARGE DIAGNOSIS  Diagnosis: Chest pain  Assessment and Plan of Treatment: Your chest pain resolved. You vital signs were normal your EKG was normal. You had an echocardiogram of your heart that was unremarkable. If your chest pain returns follow up outpatient primary care physician or return to the ER for further medical management.        SECONDARY DISCHARGE DIAGNOSES  Diagnosis: BPH (benign prostatic hyperplasia)  Assessment and Plan of Treatment: continue the flomax    Diagnosis: Essential hypertension  Assessment and Plan of Treatment: Continue blood pressure medication regimen as directed. Monitor for any visual changes, headaches or dizziness.  Monitor blood pressure regularly.  Follow up with your PCP for further management for high blood pressure.      Diagnosis: Schizophrenia  Assessment and Plan of Treatment: Continue your medications as directed and follow up with your Primary Care Physician and psychiatrist for further evaluation and medical management. If you are ever in need of immediate psychiatric assistance you may reach out to the [facility] [phone]      Diagnosis: Polysubstance abuse  Assessment and Plan of Treatment: Go to your outpatient Methadone Clinic [facility] ([phone]) on Monday for your Methadone dosing.    Diagnosis: Hepatitis C test positive  Assessment and Plan of Treatment: You were positive for Hepatitis C. You need further outpatient work up and may need further treatment. IT is important that you follow up with your primary care physician regarding these results. with in 1-2 weeks.

## 2025-03-05 ENCOUNTER — HOSPITAL ENCOUNTER (INPATIENT)
Dept: HOSPITAL 74 - YASAS | Age: 60
LOS: 23 days | Discharge: ANOTHER HOSPITAL | DRG: 772 | End: 2025-03-28
Attending: PSYCHIATRY & NEUROLOGY | Admitting: PSYCHIATRY & NEUROLOGY
Payer: COMMERCIAL

## 2025-03-05 VITALS — BODY MASS INDEX: 18.2 KG/M2

## 2025-03-05 DIAGNOSIS — Z87.11: ICD-10-CM

## 2025-03-05 DIAGNOSIS — R07.9: ICD-10-CM

## 2025-03-05 DIAGNOSIS — F11.20: Primary | ICD-10-CM

## 2025-03-05 DIAGNOSIS — J45.909: ICD-10-CM

## 2025-03-05 DIAGNOSIS — F19.24: ICD-10-CM

## 2025-03-05 DIAGNOSIS — F17.210: ICD-10-CM

## 2025-03-05 DIAGNOSIS — F19.280: ICD-10-CM

## 2025-03-05 DIAGNOSIS — R56.1: ICD-10-CM

## 2025-03-05 DIAGNOSIS — I10: ICD-10-CM

## 2025-03-05 DIAGNOSIS — Z88.0: ICD-10-CM

## 2025-03-05 DIAGNOSIS — F12.20: ICD-10-CM

## 2025-03-05 DIAGNOSIS — Z87.19: ICD-10-CM

## 2025-03-05 DIAGNOSIS — F19.282: ICD-10-CM

## 2025-03-05 PROCEDURE — HZ42ZZZ GROUP COUNSELING FOR SUBSTANCE ABUSE TREATMENT, COGNITIVE-BEHAVIORAL: ICD-10-PCS | Performed by: PSYCHIATRY & NEUROLOGY

## 2025-03-05 RX ADMIN — Medication SCH MG: at 21:21

## 2025-03-06 LAB
APPEARANCE UR: CLEAR
BACTERIA # UR AUTO: 2 /UL (ref 0–1359)
BILIRUB UR STRIP.AUTO-MCNC: (no result) MG/DL
CASTS URNS QL MICRO: 2 /UL (ref 0–3.1)
COLOR UR: (no result)
EPITH CASTS URNS QL MICRO: 6 /UL (ref 0–25.1)
KETONES UR QL STRIP: (no result)
LEUKOCYTE ESTERASE UR QL STRIP.AUTO: NEGATIVE
NITRITE UR QL STRIP: NEGATIVE
PH UR: 6 [PH] (ref 5–8)
PROT UR QL STRIP: (no result)
PROT UR QL STRIP: NEGATIVE
RBC # BLD AUTO: 7 /UL (ref 0–23.9)
SP GR UR: 1.03 (ref 1.01–1.03)
UROBILINOGEN UR STRIP-MCNC: 1 MG/DL (ref 0.2–1)
WBC # UR AUTO: 12 /UL (ref 0–25.8)

## 2025-03-06 RX ADMIN — PRAZOSIN HYDROCHLORIDE SCH: 1 CAPSULE ORAL at 22:13

## 2025-03-06 RX ADMIN — ESCITALOPRAM SCH MG: 20 TABLET, FILM COATED ORAL at 12:37

## 2025-03-06 RX ADMIN — LISINOPRIL ONE: 20 TABLET ORAL at 07:10

## 2025-03-06 RX ADMIN — Medication SCH TAB: at 09:02

## 2025-03-06 RX ADMIN — CLONAZEPAM PRN MG: 1 TABLET, ORALLY DISINTEGRATING ORAL at 12:37

## 2025-03-07 RX ADMIN — LISINOPRIL SCH MG: 20 TABLET ORAL at 09:58

## 2025-03-07 RX ADMIN — CLONAZEPAM SCH MG: 1 TABLET, ORALLY DISINTEGRATING ORAL at 17:47

## 2025-03-08 RX ADMIN — FAMOTIDINE SCH MG: 20 TABLET ORAL at 10:14

## 2025-03-10 LAB
ALBUMIN SERPL-MCNC: 4.2 G/DL (ref 3.4–5)
ALP SERPL-CCNC: 91 U/L (ref 45–117)
ALT SERPL-CCNC: 32 U/L (ref 13–61)
AMYLASE SERPL-CCNC: 64 U/L (ref 25–115)
ANION GAP SERPL CALC-SCNC: 11 MMOL/L (ref 4–13)
AST SERPL-CCNC: 29 U/L (ref 15–37)
BASOPHILS # BLD: 0.4 % (ref 0–2)
BILIRUB SERPL-MCNC: 0.6 MG/DL (ref 0.2–1)
BUN SERPL-MCNC: 23.2 MG/DL (ref 7–18)
CALCIUM SERPL-MCNC: 10.1 MG/DL (ref 8.5–10.1)
CHLORIDE SERPL-SCNC: 101 MMOL/L (ref 98–107)
CO2 SERPL-SCNC: 29 MMOL/L (ref 21–32)
CREAT SERPL-MCNC: 1 MG/DL (ref 0.55–1.3)
DEPRECATED RDW RBC AUTO: 13.3 % (ref 11.9–15.9)
EOSINOPHIL # BLD: 5.5 % (ref 0–4.5)
GLUCOSE SERPL-MCNC: 83 MG/DL (ref 74–106)
HCT VFR BLD CALC: 42 % (ref 35.4–49)
HGB BLD-MCNC: 14.9 GM/DL (ref 11.7–16.9)
INR BLD: 1.13 (ref 0.83–1.09)
LYMPHOCYTES # BLD: 33.2 % (ref 8–40)
MAGNESIUM SERPL-MCNC: 2.4 MG/DL (ref 1.8–2.4)
MCH RBC QN AUTO: 32 PG (ref 25.7–33.7)
MCHC RBC AUTO-ENTMCNC: 35.5 G/DL (ref 32–35.9)
MCV RBC: 90.1 FL (ref 80–96)
MONOCYTES # BLD AUTO: 7.7 % (ref 3.8–10.2)
NEUTROPHILS # BLD: 53.2 % (ref 42.8–82.8)
PLATELET # BLD AUTO: 151 10^3/UL (ref 134–434)
PMV BLD: 9.5 FL (ref 7.5–11.1)
POTASSIUM SERPLBLD-SCNC: 4 MMOL/L (ref 3.5–5.1)
PROT SERPL-MCNC: 8.2 G/DL (ref 6.4–8.2)
PT PNL PPP: 12.4 SEC (ref 9.7–13)
RBC # BLD AUTO: 4.66 M/MM3 (ref 4–5.6)
SODIUM SERPL-SCNC: 141 MMOL/L (ref 136–145)
WBC # BLD AUTO: 6.5 K/MM3 (ref 4–10)

## 2025-03-10 RX ADMIN — AMLODIPINE BESYLATE SCH MG: 10 TABLET ORAL at 11:37

## 2025-03-10 RX ADMIN — LISINOPRIL SCH MG: 20 TABLET ORAL at 06:08

## 2025-03-17 LAB
APPEARANCE UR: CLEAR
BILIRUB UR STRIP.AUTO-MCNC: NEGATIVE MG/DL
COLOR UR: YELLOW
KETONES UR QL STRIP: NEGATIVE
LEUKOCYTE ESTERASE UR QL STRIP.AUTO: NEGATIVE
NITRITE UR QL STRIP: NEGATIVE
PH UR: 7 [PH] (ref 5–8)
PROT UR QL STRIP: NEGATIVE
PROT UR QL STRIP: NEGATIVE
SP GR UR: 1.01 (ref 1.01–1.03)
UROBILINOGEN UR STRIP-MCNC: 1 MG/DL (ref 0.2–1)

## 2025-03-22 RX ADMIN — ALUMINUM HYDROXIDE, MAGNESIUM HYDROXIDE, AND SIMETHICONE PRN ML: 200; 200; 20 SUSPENSION ORAL at 05:26

## 2025-03-25 RX ADMIN — NICOTINE POLACRILEX PRN MG: 4 LOZENGE ORAL at 07:08

## 2025-03-25 RX ADMIN — CYPROHEPTADINE HYDROCHLORIDE SCH MG: 4 TABLET ORAL at 17:07

## 2025-03-26 RX ADMIN — NICOTINE SCH MG: 7 PATCH TRANSDERMAL at 09:42

## 2025-03-27 RX ADMIN — FAMOTIDINE SCH: 20 TABLET ORAL at 23:00

## 2025-03-28 ENCOUNTER — HOSPITAL ENCOUNTER (OUTPATIENT)
Dept: HOSPITAL 74 - JER | Age: 60
Setting detail: OBSERVATION
LOS: 3 days | Discharge: HOME | End: 2025-03-31
Attending: PHYSICIAN ASSISTANT | Admitting: INTERNAL MEDICINE
Payer: COMMERCIAL

## 2025-03-28 VITALS
SYSTOLIC BLOOD PRESSURE: 124 MMHG | HEART RATE: 96 BPM | RESPIRATION RATE: 16 BRPM | TEMPERATURE: 98.7 F | DIASTOLIC BLOOD PRESSURE: 71 MMHG

## 2025-03-28 VITALS — BODY MASS INDEX: 19.3 KG/M2

## 2025-03-28 DIAGNOSIS — D69.6: ICD-10-CM

## 2025-03-28 DIAGNOSIS — B97.4: Primary | ICD-10-CM

## 2025-03-28 DIAGNOSIS — Z87.820: ICD-10-CM

## 2025-03-28 DIAGNOSIS — Z79.891: ICD-10-CM

## 2025-03-28 DIAGNOSIS — R25.1: ICD-10-CM

## 2025-03-28 DIAGNOSIS — F12.90: ICD-10-CM

## 2025-03-28 DIAGNOSIS — J45.909: ICD-10-CM

## 2025-03-28 DIAGNOSIS — R79.89: ICD-10-CM

## 2025-03-28 DIAGNOSIS — I31.9: ICD-10-CM

## 2025-03-28 DIAGNOSIS — Z87.19: ICD-10-CM

## 2025-03-28 DIAGNOSIS — I45.81: ICD-10-CM

## 2025-03-28 LAB
ABSOLUTE IMMATURE GRANULOCYTES: 0.03 X10^3/UL (ref 0–0.03)
ALBUMIN SERPL-MCNC: 3.6 G/DL (ref 3.4–5)
ALP SERPL-CCNC: 67 U/L (ref 45–117)
ALT SERPL-CCNC: 43 U/L (ref 13–61)
ANION GAP SERPL CALC-SCNC: 6 MMOL/L (ref 4–13)
APTT BLD: 31 SECONDS (ref 25.2–36.5)
AST SERPL-CCNC: 23 U/L (ref 15–37)
BASOPHILS #: 0.01 X10^3/UL (ref 0.01–0.08)
BILIRUB SERPL-MCNC: 1.2 MG/DL (ref 0.2–1)
BNP SERPL-MCNC: 538.4 PG/ML (ref 5–125)
BUN SERPL-MCNC: 24.8 MG/DL (ref 7–18)
CALCIUM SERPL-MCNC: 9.8 MG/DL (ref 8.5–10.1)
CHLORIDE SERPL-SCNC: 104 MMOL/L (ref 98–107)
CO2 SERPL-SCNC: 30 MMOL/L (ref 21–32)
CREAT SERPL-MCNC: 0.9 MG/DL (ref 0.55–1.3)
EOSINOPHIL %: 0.9 % (ref 0.8–7)
EOSINOPHILS #: 0.07 X10^3/UL (ref 0.04–0.54)
GLUCOSE SERPL-MCNC: 100 MG/DL (ref 74–106)
HEMATOCRIT: 39.2 % (ref 40.1–51)
HEMOGLOBIN: 12.7 G/DL (ref 13.7–17.5)
IMMATURE PLATELET FRACTION #: 4.5 X10^3/UL
INR BLD: 1.17 (ref 0.83–1.09)
MAGNESIUM SERPL-MCNC: 2.5 MG/DL (ref 1.8–2.4)
MCHC: 32.4 G/DL (ref 32.3–36.5)
MEAN CELL VOLUME: 94.2 FL (ref 79–92.2)
MEAN PLT VOLUME: 11 FL (ref 9.4–12.4)
MONOCYTE #: 1.09 X10^3/UL (ref 0.3–0.82)
MONOCYTE %: 13.7 % (ref 5.3–12.2)
PLATELET COUNT: 125 X10^3/UL (ref 163–337)
POTASSIUM SERPLBLD-SCNC: 4.1 MMOL/L (ref 3.5–5.1)
PROT SERPL-MCNC: 7.5 G/DL (ref 6.4–8.2)
PT PNL PPP: 12.9 SEC (ref 9.7–13)
RDW: 13.3 % (ref 12.2–16.4)
SODIUM SERPL-SCNC: 140 MMOL/L (ref 136–145)

## 2025-03-28 PROCEDURE — G0378 HOSPITAL OBSERVATION PER HR: HCPCS

## 2025-03-28 PROCEDURE — 3E033NZ INTRODUCTION OF ANALGESICS, HYPNOTICS, SEDATIVES INTO PERIPHERAL VEIN, PERCUTANEOUS APPROACH: ICD-10-PCS | Performed by: PHYSICIAN ASSISTANT

## 2025-03-28 RX ADMIN — ALUMINUM HYDROXIDE, MAGNESIUM HYDROXIDE, AND SIMETHICONE ONE ML: 200; 200; 20 SUSPENSION ORAL at 14:29

## 2025-03-28 RX ADMIN — CHOLECALCIFEROL TAB 10 MCG (400 UNIT) SCH UNIT: 10 TAB at 10:31

## 2025-03-28 RX ADMIN — COLCHICINE SCH MG: 0.6 CAPSULE ORAL at 22:01

## 2025-03-28 RX ADMIN — ASPIRIN 81 MG ONE MG: 81 TABLET ORAL at 14:29

## 2025-03-28 RX ADMIN — HEPARIN SODIUM SCH UNIT: 5000 INJECTION, SOLUTION INTRAVENOUS; SUBCUTANEOUS at 22:01

## 2025-03-28 RX ADMIN — ASPIRIN 325 MG ORAL TABLET SCH MG: 325 PILL ORAL at 22:01

## 2025-03-28 RX ADMIN — ASPIRIN 81 MG ONE: 81 TABLET ORAL at 13:41

## 2025-03-29 LAB
ANION GAP SERPL CALC-SCNC: 7 MMOL/L (ref 4–13)
BUN SERPL-MCNC: 22.6 MG/DL (ref 7–18)
CALCIUM SERPL-MCNC: 9.3 MG/DL (ref 8.5–10.1)
CHLORIDE SERPL-SCNC: 102 MMOL/L (ref 98–107)
CO2 SERPL-SCNC: 30 MMOL/L (ref 21–32)
CREAT SERPL-MCNC: 0.7 MG/DL (ref 0.55–1.3)
GLUCOSE SERPL-MCNC: 88 MG/DL (ref 74–106)
HEMATOCRIT: 39.9 % (ref 40.1–51)
HEMOGLOBIN: 13.1 G/DL (ref 13.7–17.5)
MAGNESIUM SERPL-MCNC: 2.1 MG/DL (ref 1.8–2.4)
MCHC: 32.8 G/DL (ref 32.3–36.5)
MEAN CELL VOLUME: 93 FL (ref 79–92.2)
MEAN PLT VOLUME: 11.4 FL (ref 9.4–12.4)
PHOSPHATE SERPL-MCNC: 2.7 MG/DL (ref 2.5–4.9)
PLATELET COUNT: 129 X10^3/UL (ref 163–337)
POTASSIUM SERPLBLD-SCNC: 3.7 MMOL/L (ref 3.5–5.1)
RDW: 13.4 % (ref 12.2–16.4)
SODIUM SERPL-SCNC: 140 MMOL/L (ref 136–145)

## 2025-03-29 RX ADMIN — ACETAMINOPHEN ONE MG: 10 INJECTION, SOLUTION INTRAVENOUS at 06:37

## 2025-03-29 RX ADMIN — ACETAMINOPHEN ONE MG: 10 INJECTION, SOLUTION INTRAVENOUS at 18:43

## 2025-03-29 RX ADMIN — PANTOPRAZOLE SODIUM SCH MG: 40 TABLET, DELAYED RELEASE ORAL at 21:19

## 2025-03-30 VITALS — RESPIRATION RATE: 16 BRPM

## 2025-03-30 LAB
ALBUMIN SERPL-MCNC: 3.2 G/DL (ref 3.4–5)
ALP SERPL-CCNC: 55 U/L (ref 45–117)
ALT SERPL-CCNC: 39 U/L (ref 13–61)
ANION GAP SERPL CALC-SCNC: 6 MMOL/L (ref 4–13)
AST SERPL-CCNC: 39 U/L (ref 15–37)
BILIRUB SERPL-MCNC: 0.8 MG/DL (ref 0.2–1)
BUN SERPL-MCNC: 27.9 MG/DL (ref 7–18)
CALCIUM SERPL-MCNC: 9.3 MG/DL (ref 8.5–10.1)
CHLORIDE SERPL-SCNC: 102 MMOL/L (ref 98–107)
CO2 SERPL-SCNC: 31 MMOL/L (ref 21–32)
CREAT SERPL-MCNC: 0.8 MG/DL (ref 0.55–1.3)
GLUCOSE SERPL-MCNC: 109 MG/DL (ref 74–106)
HEMATOCRIT: 34.6 % (ref 40.1–51)
HEMOGLOBIN: 11.1 G/DL (ref 13.7–17.5)
MAGNESIUM SERPL-MCNC: 2.4 MG/DL (ref 1.8–2.4)
MCHC: 32.1 G/DL (ref 32.3–36.5)
MEAN CELL VOLUME: 93.8 FL (ref 79–92.2)
MEAN PLT VOLUME: 12.5 FL (ref 9.4–12.4)
PLATELET COUNT: 107 X10^3/UL (ref 163–337)
POTASSIUM SERPLBLD-SCNC: 4.8 MMOL/L (ref 3.5–5.1)
PROT SERPL-MCNC: 6.9 G/DL (ref 6.4–8.2)
RDW: 13.6 % (ref 12.2–16.4)
SODIUM SERPL-SCNC: 139 MMOL/L (ref 136–145)

## 2025-03-30 RX ADMIN — Medication SCH: at 22:00

## 2025-03-30 RX ADMIN — LIDOCAINE SCH PATCH: 50 PATCH TOPICAL at 18:22

## 2025-03-31 VITALS — DIASTOLIC BLOOD PRESSURE: 81 MMHG | TEMPERATURE: 97.5 F | HEART RATE: 78 BPM | SYSTOLIC BLOOD PRESSURE: 128 MMHG

## 2025-03-31 LAB
ALBUMIN SERPL-MCNC: 3.5 G/DL (ref 3.4–5)
ALP SERPL-CCNC: 64 U/L (ref 45–117)
ALT SERPL-CCNC: 38 U/L (ref 13–61)
ANION GAP SERPL CALC-SCNC: 8 MMOL/L (ref 4–13)
AST SERPL-CCNC: 28 U/L (ref 15–37)
BILIRUB SERPL-MCNC: 0.9 MG/DL (ref 0.2–1)
BUN SERPL-MCNC: 29.8 MG/DL (ref 7–18)
CALCIUM SERPL-MCNC: 9.4 MG/DL (ref 8.5–10.1)
CHLORIDE SERPL-SCNC: 102 MMOL/L (ref 98–107)
CO2 SERPL-SCNC: 28 MMOL/L (ref 21–32)
CREAT SERPL-MCNC: 0.9 MG/DL (ref 0.55–1.3)
GLUCOSE SERPL-MCNC: 130 MG/DL (ref 74–106)
HEMATOCRIT: 36.5 % (ref 40.1–51)
HEMOGLOBIN: 12 G/DL (ref 13.7–17.5)
MAGNESIUM SERPL-MCNC: 2.4 MG/DL (ref 1.8–2.4)
MCHC: 32.9 G/DL (ref 32.3–36.5)
MEAN CELL VOLUME: 92.2 FL (ref 79–92.2)
MEAN PLT VOLUME: 10.7 FL (ref 9.4–12.4)
PLATELET COUNT: 163 X10^3/UL (ref 163–337)
POTASSIUM SERPLBLD-SCNC: 4.4 MMOL/L (ref 3.5–5.1)
PROT SERPL-MCNC: 7.4 G/DL (ref 6.4–8.2)
RDW: 13.6 % (ref 12.2–16.4)
SODIUM SERPL-SCNC: 138 MMOL/L (ref 136–145)

## 2025-03-31 RX ADMIN — ASPIRIN 325 MG ORAL TABLET SCH MG: 325 PILL ORAL at 09:17

## 2025-03-31 RX ADMIN — PANTOPRAZOLE SODIUM SCH MG: 40 TABLET, DELAYED RELEASE ORAL at 09:17

## 2025-07-02 ENCOUNTER — HOSPITAL ENCOUNTER (INPATIENT)
Dept: HOSPITAL 74 - YASAS | Age: 60
LOS: 6 days | Discharge: OTHER INSTITUTION | DRG: 773 | End: 2025-07-08
Attending: ALLERGY & IMMUNOLOGY | Admitting: NEUROMUSCULOSKELETAL MEDICINE & OMM
Payer: COMMERCIAL

## 2025-07-02 VITALS — BODY MASS INDEX: 20 KG/M2

## 2025-07-02 DIAGNOSIS — I10: ICD-10-CM

## 2025-07-02 DIAGNOSIS — F17.210: ICD-10-CM

## 2025-07-02 DIAGNOSIS — F11.23: Primary | ICD-10-CM

## 2025-07-02 DIAGNOSIS — G40.909: ICD-10-CM

## 2025-07-02 DIAGNOSIS — F43.10: ICD-10-CM

## 2025-07-02 DIAGNOSIS — F20.9: ICD-10-CM

## 2025-07-02 PROCEDURE — HZ2ZZZZ DETOXIFICATION SERVICES FOR SUBSTANCE ABUSE TREATMENT: ICD-10-PCS | Performed by: ALLERGY & IMMUNOLOGY

## 2025-07-02 RX ADMIN — MUPIROCIN SCH APPLIC: 20 OINTMENT TOPICAL at 22:35

## 2025-07-02 RX ADMIN — METHADONE HYDROCHLORIDE ONE MG: 10 TABLET ORAL at 18:43

## 2025-07-02 RX ADMIN — Medication SCH MG: at 22:36

## 2025-07-02 RX ADMIN — PANTOPRAZOLE SODIUM SCH MG: 40 TABLET, DELAYED RELEASE ORAL at 22:36

## 2025-07-02 RX ADMIN — CLONIDINE HYDROCHLORIDE SCH MG: 0.1 TABLET ORAL at 18:43

## 2025-07-03 LAB
ALBUMIN SERPL-MCNC: 3.5 G/DL (ref 3.4–5)
ALP SERPL-CCNC: 66 U/L (ref 45–117)
ALT SERPL-CCNC: 24 U/L (ref 13–61)
ANION GAP SERPL CALC-SCNC: 7 MMOL/L (ref 4–13)
AST SERPL-CCNC: 16 U/L (ref 15–37)
BILIRUB SERPL-MCNC: 0.5 MG/DL (ref 0.2–1)
BUN SERPL-MCNC: 20.6 MG/DL (ref 7–18)
CALCIUM SERPL-MCNC: 10 MG/DL (ref 8.5–10.1)
CHLORIDE SERPL-SCNC: 104 MMOL/L (ref 98–107)
CO2 SERPL-SCNC: 30 MMOL/L (ref 21–32)
CREAT SERPL-MCNC: 0.8 MG/DL (ref 0.55–1.3)
ERYTHROCYTE [DISTWIDTH] IN BLOOD: 13.3 % (ref 12.2–16.4)
GLUCOSE SERPL-MCNC: 117 MG/DL (ref 74–106)
HCT VFR BLD CALC: 37.3 % (ref 40.1–51)
HGB BLD-MCNC: 12.1 G/DL (ref 13.7–17.5)
MCHC RBC-ENTMCNC: 32.4 G/DL (ref 32.3–36.5)
MCV RBC: 94.4 FL (ref 79–92.2)
PLATELET # BLD AUTO: 129 X10^3/UL (ref 163–337)
PLATELETS.RETICULATED NFR BLD AUTO: (no result) % (ref 0.9–11.2)
PLATELETS.RETICULATED NFR BLD AUTO: 6.7 X10^3/UL
PMV BLD: 11.7 FL (ref 9.4–12.4)
POTASSIUM SERPLBLD-SCNC: 4 MMOL/L (ref 3.5–5.1)
PROT SERPL-MCNC: 6.7 G/DL (ref 6.4–8.2)
SODIUM SERPL-SCNC: 141 MMOL/L (ref 136–145)

## 2025-07-03 RX ADMIN — METHADONE HYDROCHLORIDE ONE MG: 40 TABLET ORAL at 12:17

## 2025-07-03 RX ADMIN — METHOCARBAMOL PRN MG: 500 TABLET ORAL at 05:55

## 2025-07-03 RX ADMIN — BENZONATATE PRN MG: 200 CAPSULE ORAL at 05:55

## 2025-07-03 RX ADMIN — DIAZEPAM SCH MG: 5 TABLET ORAL at 17:34

## 2025-07-03 RX ADMIN — CLONIDINE HYDROCHLORIDE SCH MG: 0.1 TABLET ORAL at 13:09

## 2025-07-03 RX ADMIN — HYDROXYZINE PAMOATE PRN MG: 25 CAPSULE ORAL at 05:55

## 2025-07-03 RX ADMIN — METHADONE HYDROCHLORIDE ONE MG: 40 TABLET ORAL at 10:06

## 2025-07-03 RX ADMIN — Medication SCH TAB: at 10:07

## 2025-07-03 RX ADMIN — BENZTROPINE MESYLATE SCH MG: 1 TABLET ORAL at 22:30

## 2025-07-04 RX ADMIN — METHADONE HYDROCHLORIDE ONE MG: 10 TABLET ORAL at 11:02

## 2025-07-04 RX ADMIN — METHADONE HYDROCHLORIDE SCH MG: 40 TABLET ORAL at 05:41

## 2025-07-04 RX ADMIN — ESCITALOPRAM OXALATE SCH MG: 10 TABLET, FILM COATED ORAL at 10:59

## 2025-07-04 RX ADMIN — ARIPIPRAZOLE SCH MG: 2 TABLET ORAL at 11:03

## 2025-07-05 RX ADMIN — DIAZEPAM SCH MG: 5 TABLET ORAL at 05:33

## 2025-07-05 RX ADMIN — ARIPIPRAZOLE SCH MG: 5 TABLET ORAL at 10:32

## 2025-07-05 RX ADMIN — METHADONE HYDROCHLORIDE ONE MG: 10 TABLET ORAL at 10:40

## 2025-07-06 RX ADMIN — METHADONE HYDROCHLORIDE ONE MG: 10 TABLET ORAL at 10:21

## 2025-07-06 RX ADMIN — DIAZEPAM SCH MG: 5 TABLET ORAL at 05:32

## 2025-07-07 RX ADMIN — METHADONE HYDROCHLORIDE ONE MG: 10 TABLET ORAL at 10:10

## 2025-07-07 RX ADMIN — DIAZEPAM ONE MG: 5 TABLET ORAL at 06:00

## 2025-07-07 RX ADMIN — CLONIDINE HYDROCHLORIDE ONE MG: 0.1 TABLET ORAL at 20:58

## 2025-07-07 RX ADMIN — BENZTROPINE MESYLATE SCH MG: 1 TABLET ORAL at 11:04

## 2025-07-08 VITALS — DIASTOLIC BLOOD PRESSURE: 70 MMHG | HEART RATE: 74 BPM | TEMPERATURE: 98.7 F | SYSTOLIC BLOOD PRESSURE: 140 MMHG

## 2025-07-08 VITALS — RESPIRATION RATE: 16 BRPM

## 2025-07-08 RX ADMIN — METHADONE HYDROCHLORIDE ONE MG: 10 TABLET ORAL at 10:04
